# Patient Record
Sex: FEMALE | URBAN - METROPOLITAN AREA
[De-identification: names, ages, dates, MRNs, and addresses within clinical notes are randomized per-mention and may not be internally consistent; named-entity substitution may affect disease eponyms.]

---

## 2020-12-22 NOTE — PATIENT DISCUSSION
GLAUCOMA SUSPECT:  I have talked with the patient about my impressions, explained our treatment plan, and have answered all questions and patient understands.

## 2022-03-24 ENCOUNTER — IMPORTED ENCOUNTER (OUTPATIENT)
Dept: URBAN - METROPOLITAN AREA CLINIC 31 | Facility: CLINIC | Age: 57
End: 2022-03-24

## 2022-03-24 PROCEDURE — 92004 COMPRE OPH EXAM NEW PT 1/>: CPT

## 2022-03-24 PROCEDURE — 92310 CONTACT LENS FITTING OU: CPT

## 2022-03-24 PROCEDURE — 92015 DETERMINE REFRACTIVE STATE: CPT

## 2022-03-24 NOTE — PATIENT DISCUSSION
Refractive error Annual Good ocular health documented. Discussed options of glasses contacts or refractive surgery. Discussed importance of annual eye exams. Rx glasses. 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 Order trial CLs for distance and may wear reders over or try mono wishes.

## 2022-04-01 ASSESSMENT — VISUAL ACUITY
OS_SC: 20/20
OS_CC: J116''
OD_SC: 20/20
OD_CC: J216''

## 2022-04-01 ASSESSMENT — TONOMETRY
OS_IOP_MMHG: 14
OD_IOP_MMHG: 14

## 2023-07-06 ENCOUNTER — ESTABLISHED PATIENT (OUTPATIENT)
Dept: URBAN - METROPOLITAN AREA CLINIC 31 | Facility: CLINIC | Age: 58
End: 2023-07-06

## 2023-07-06 DIAGNOSIS — H52.13: ICD-10-CM

## 2023-07-06 PROCEDURE — 92014 COMPRE OPH EXAM EST PT 1/>: CPT

## 2023-07-06 PROCEDURE — 92015 DETERMINE REFRACTIVE STATE: CPT

## 2023-07-06 PROCEDURE — 92310-3 LEVEL 3 CONTACT LENS MANAGEMENT

## 2023-07-06 ASSESSMENT — VISUAL ACUITY
OU_CC: J1+
OD_CC: J1+
OS_CC: 20/30+1
OS_SC: J1+
OU_CC: 20/30
OD_CC: 20/30-1
OS_CC: J1+
OD_SC: J1+

## 2023-07-06 ASSESSMENT — TONOMETRY
OS_IOP_MMHG: 13
OD_IOP_MMHG: 13

## 2023-07-13 ENCOUNTER — CONTACT LENSES/GLASSES VISIT (OUTPATIENT)
Dept: URBAN - METROPOLITAN AREA CLINIC 31 | Facility: CLINIC | Age: 58
End: 2023-07-13

## 2023-07-13 DIAGNOSIS — H52.13: ICD-10-CM

## 2023-07-13 PROCEDURE — 92310F

## 2023-07-13 ASSESSMENT — VISUAL ACUITY
OS_CC: 20/20
OD_CC: 20/20

## 2024-07-18 ENCOUNTER — COMPREHENSIVE EXAM (OUTPATIENT)
Dept: URBAN - METROPOLITAN AREA CLINIC 31 | Facility: CLINIC | Age: 59
End: 2024-07-18

## 2024-07-18 DIAGNOSIS — H52.13: ICD-10-CM

## 2024-07-18 PROCEDURE — 92015 DETERMINE REFRACTIVE STATE: CPT

## 2024-07-18 PROCEDURE — 92310-3 LEVEL 3 CONTACT LENS MANAGEMENT

## 2024-07-18 PROCEDURE — 92014 COMPRE OPH EXAM EST PT 1/>: CPT

## 2024-07-18 ASSESSMENT — TONOMETRY
OS_IOP_MMHG: 14
OD_IOP_MMHG: 14

## 2024-07-18 ASSESSMENT — VISUAL ACUITY
OD_CC: 20/30
OS_CC: 20/25
OD_CC: J10
OS_CC: J3

## 2025-07-31 ENCOUNTER — COMPREHENSIVE EXAM (OUTPATIENT)
Age: 60
End: 2025-07-31

## 2025-07-31 DIAGNOSIS — H52.13: ICD-10-CM

## 2025-07-31 PROCEDURE — 92015 DETERMINE REFRACTIVE STATE: CPT

## 2025-07-31 PROCEDURE — 92310-1 LEVEL 1 SOFT LENS UPDATE

## 2025-07-31 PROCEDURE — 92014 COMPRE OPH EXAM EST PT 1/>: CPT
